# Patient Record
Sex: FEMALE | Race: WHITE | NOT HISPANIC OR LATINO | URBAN - METROPOLITAN AREA
[De-identification: names, ages, dates, MRNs, and addresses within clinical notes are randomized per-mention and may not be internally consistent; named-entity substitution may affect disease eponyms.]

---

## 2017-01-05 ENCOUNTER — OFFICE (OUTPATIENT)
Dept: URBAN - METROPOLITAN AREA CLINIC 14 | Facility: CLINIC | Age: 82
End: 2017-01-05
Payer: MEDICARE

## 2017-01-05 VITALS
SYSTOLIC BLOOD PRESSURE: 150 MMHG | WEIGHT: 88 LBS | DIASTOLIC BLOOD PRESSURE: 86 MMHG | HEIGHT: 59 IN | HEART RATE: 98 BPM

## 2017-01-05 DIAGNOSIS — I10 ESSENTIAL (PRIMARY) HYPERTENSION: ICD-10-CM

## 2017-01-05 DIAGNOSIS — K21.9 GASTRO-ESOPHAGEAL REFLUX DISEASE WITHOUT ESOPHAGITIS: ICD-10-CM

## 2017-01-05 DIAGNOSIS — R14.0 ABDOMINAL DISTENSION (GASEOUS): ICD-10-CM

## 2017-01-05 DIAGNOSIS — R63.6 UNDERWEIGHT: ICD-10-CM

## 2017-01-05 DIAGNOSIS — A04.9 BACTERIAL INTESTINAL INFECTION, UNSPECIFIED: ICD-10-CM

## 2017-01-05 PROCEDURE — 99213 OFFICE O/P EST LOW 20 MIN: CPT | Performed by: INTERNAL MEDICINE

## 2017-01-05 PROCEDURE — G8427 DOCREV CUR MEDS BY ELIG CLIN: HCPCS | Performed by: INTERNAL MEDICINE

## 2017-01-05 NOTE — SERVICEHPINOTES
Mrs. Bermudez is an 81-year-old female here for follow-up of abdominal bloating, pain, and change in bowel habits. She was initially seen by me in 11/14. At that time she was having abdominal pain and bloating and had an extensive negative workup including CT scan, CT angiogram, EGD, and blood work. Lactulose breath test was strongly positive and so she was given a trial of rifaximin. This greatly improved her symptoms and she had been doing well up until 2015 when she had return of bloating and lower abdominal pain. Blood work was negative. CT scan was performed and was also negative. Around the time she was placed on antibiotics for urinary tract infection. Her symptoms were similar to her bout of small intestinal bacterial overgrowth. Because of that we placed her on another course of rifaximin, which she tolerated very well . Her last colonoscopy was in 2011 and was normal. Recall colonoscopy was recommended for 10 years.The patient now comes in for scheduled follow-up. She has been doing well over the past year. She denies any fevers, chills, nausea, vomiting, abdominal pain, bloating, diarrhea, constipation, or rectal bleeding. Her weight is unchanged. She states that she was diagnosed with bladder cancer a few months ago and has had some procedures for this appears to be doing well at this point.BR

## 2017-01-05 NOTE — SERVICENOTES
The patient has been doing well.  She has not had any flares of her bacterial overgrowth.  She was told that she may follow-up on an as-needed basis and notify us if she has any similar symptoms at which time we can consider another trial of Xifaxan, which has worked well for her in the past on two separate occasions.  In the meantime she should continue her probiotic.  Her reflux is also much more improved as she states that she only takes Nexium on an as-needed basis.  I did recommend that she consider switching to Pepcid or Zantac to take as needed but she should notify us she has to take Nexium more regularly.  As she is not taking Nexium on a regular basis we will have her follow up as needed.

## 2021-04-29 ENCOUNTER — OFFICE (OUTPATIENT)
Dept: URBAN - METROPOLITAN AREA CLINIC 14 | Facility: CLINIC | Age: 86
End: 2021-04-29
Payer: OTHER GOVERNMENT

## 2021-04-29 VITALS
DIASTOLIC BLOOD PRESSURE: 69 MMHG | RESPIRATION RATE: 16 BRPM | WEIGHT: 81 LBS | SYSTOLIC BLOOD PRESSURE: 150 MMHG | HEIGHT: 59 IN | HEART RATE: 65 BPM | OXYGEN SATURATION: 93 %

## 2021-04-29 DIAGNOSIS — C76.0 MALIGNANT NEOPLASM OF HEAD, FACE AND NECK: ICD-10-CM

## 2021-04-29 DIAGNOSIS — A04.9 BACTERIAL INTESTINAL INFECTION, UNSPECIFIED: ICD-10-CM

## 2021-04-29 DIAGNOSIS — K59.00 CONSTIPATION, UNSPECIFIED: ICD-10-CM

## 2021-04-29 DIAGNOSIS — R10.30 LOWER ABDOMINAL PAIN, UNSPECIFIED: ICD-10-CM

## 2021-04-29 DIAGNOSIS — R14.0 ABDOMINAL DISTENSION (GASEOUS): ICD-10-CM

## 2021-04-29 PROCEDURE — 99204 OFFICE O/P NEW MOD 45 MIN: CPT | Performed by: INTERNAL MEDICINE

## 2021-04-29 RX ORDER — POLYETHYLENE GLYCOL 3350 17 G/17G
POWDER, FOR SOLUTION ORAL
Qty: 3 | Refills: 3 | Status: ACTIVE
Start: 2021-04-29

## 2021-04-29 NOTE — SERVICEHPINOTES
Mrs. Bermudez is an 85-year-old female here for evaluation of abdominal bloating, pain, and change in bowel habits. The patient was lost to follow-up and overall has been doing well since we last saw her except for diagnosis of head and neck cancer which she states is under better control now. Where she was recently seen by her PCP who did blood work in January 2021 which revealed normal CBC and normal CMP. She states that a few weeks ago she started having some issues with a change in bowel habit where she had one day of diarrhea but then since then has had 2-3 bowel movements a day which are usually hard, small, and formed. She denies any loose stool. She does note that it is associated with increased bloating, gas, and crampy lower abdominal pain. The pain is better after having a bowel movement. She denies any recent antibiotics or changes in her diet. She denies any recent travel. She states that she did lose weight whenever she underwent treatment for head and neck cancer but that her weight has been stable since then. The patient is due for average risk screening colonoscopy, however given her age and comorbidity she would like to forego this.Previous GI History:Philomena was initially seen by me in 11/14. At that time she was having abdominal pain and bloating and had an extensive negative workup including CT scan, CT angiogram, EGD, and blood work. Lactulose breath test was strongly positive and so she was given a trial of rifaximin. This greatly improved her symptoms and she had been doing well up until 2015 when she had return of bloating and lower abdominal pain. Blood work was negative. CT scan was performed and was also negative. Around the time she was placed on antibiotics for urinary tract infection. Her symptoms were similar to her bout of small intestinal bacterial overgrowth. Because of that we placed her on another course of rifaximin, which she tolerated very well. Her last colonoscopy was in 2011 and was normal.ASYA

## 2021-04-29 NOTE — SERVICENOTES
The patient is having symptoms similar to when we saw her few years ago.  Her symptoms appear more constipation predominant this time with small hard bowel movements that are not complete.  This is associated with bloating and crampy lower abdominal pain.  Because of this the patient would like to start more conservatively trying MiraLax daily which is reasonable.  I did recommend she take a probiotic as well. If her bowel habits improved but she continues to have bloating and pain then I do recommend a trial of Xifaxan or cross-sectional abdominal imaging as well. We did discuss whether not to go with average risk screening colonoscopy, however given her age and comorbidities the patient states that she would prefer not undergo this if at all possible.  This is fine, however if her symptoms do not improve then it may be reasonable to undergo colonoscopy.  Fecal occult blood testing today was negative.

## 2021-06-10 ENCOUNTER — OFFICE (OUTPATIENT)
Dept: URBAN - METROPOLITAN AREA CLINIC 14 | Facility: CLINIC | Age: 86
End: 2021-06-10
Payer: OTHER GOVERNMENT

## 2021-06-10 VITALS
SYSTOLIC BLOOD PRESSURE: 153 MMHG | WEIGHT: 78 LBS | OXYGEN SATURATION: 93 % | RESPIRATION RATE: 18 BRPM | HEART RATE: 60 BPM | HEIGHT: 57 IN | DIASTOLIC BLOOD PRESSURE: 75 MMHG

## 2021-06-10 DIAGNOSIS — A04.9 BACTERIAL INTESTINAL INFECTION, UNSPECIFIED: ICD-10-CM

## 2021-06-10 DIAGNOSIS — K59.00 CONSTIPATION, UNSPECIFIED: ICD-10-CM

## 2021-06-10 DIAGNOSIS — R10.30 LOWER ABDOMINAL PAIN, UNSPECIFIED: ICD-10-CM

## 2021-06-10 DIAGNOSIS — R63.4 ABNORMAL WEIGHT LOSS: ICD-10-CM

## 2021-06-10 DIAGNOSIS — C76.0 MALIGNANT NEOPLASM OF HEAD, FACE AND NECK: ICD-10-CM

## 2021-06-10 DIAGNOSIS — R14.0 ABDOMINAL DISTENSION (GASEOUS): ICD-10-CM

## 2021-06-10 LAB
CBC, PLATELET, NO DIFFERENTIAL: HEMATOCRIT: 37.7 % (ref 34–46.6)
CBC, PLATELET, NO DIFFERENTIAL: HEMOGLOBIN: 12 G/DL (ref 11.1–15.9)
CBC, PLATELET, NO DIFFERENTIAL: MCH: 29.5 PG (ref 26.6–33)
CBC, PLATELET, NO DIFFERENTIAL: MCHC: 31.8 G/DL (ref 31.5–35.7)
CBC, PLATELET, NO DIFFERENTIAL: MCV: 93 FL (ref 79–97)
CBC, PLATELET, NO DIFFERENTIAL: NRBC: (no result)
CBC, PLATELET, NO DIFFERENTIAL: PLATELETS: 229 X10E3/UL (ref 150–450)
CBC, PLATELET, NO DIFFERENTIAL: RBC: 4.07 X10E6/UL (ref 3.77–5.28)
CBC, PLATELET, NO DIFFERENTIAL: RDW: 14.1 % (ref 11.7–15.4)
CBC, PLATELET, NO DIFFERENTIAL: WBC: 4.9 X10E3/UL (ref 3.4–10.8)

## 2021-06-10 PROCEDURE — 99214 OFFICE O/P EST MOD 30 MIN: CPT | Performed by: INTERNAL MEDICINE

## 2021-06-10 NOTE — SERVICENOTES
The patient's constipation is improved which has improved some of her symptoms though she still does have some bloating and abdominal pain.  She did respond very well to Xifaxan the past so we will give her another course of this.  Because of persistent symptoms will also check cross-sectional abdominal imaging as well. We did discuss the possibility of EGD and colonoscopy given her symptoms and weight loss.  The patient still is hesitant to proceed with this given her age and frailty, however she states that she would be willing to undergo this pending results of her noninvasive studies as above.  Of no recent fecal occult blood testing was negative.

## 2021-06-10 NOTE — SERVICEHPINOTES
Mrs. Bermudez is an 85-year-old female here for follow up. After we last saw her we did check a KUB which was unremarkable. We did recommend she start taking some MiraLax and fiber because her constipation. She states her constipation is much improved and this has improved some of her symptoms though she still does have some occasional crampy lower abdominal pain associated with bloating and gas. Of note, the patient states that she has lost weight due to her head neck cancer and bladder cancer. When we saw her in April 2021 she weighed 81 lb and now she is down to 78 lb. She states that she is normally around 78 lb though are prior visits from a few years ago revealed that she is more in the upper 80s or 90s. She denies any nausea or vomiting. She states that she is eating well and try to take nutritional supplements. FOBT in April 2021 was negative.Previous GI History:Philomena was initially seen by me in 11/14. At that time she was having abdominal pain and bloating and had an extensive negative workup including CT scan, CT angiogram, EGD, and blood work. Lactulose breath test was strongly positive and so she was given a trial of rifaximin. This greatly improved her symptoms and she had been doing well up until 2015 when she had return of bloating and lower abdominal pain. Blood work was negative. CT scan was performed and was also negative. Around the time she was placed on antibiotics for urinary tract infection. Her symptoms were similar to her bout of small intestinal bacterial overgrowth. Because of that we placed her on another course of rifaximin, which she tolerated very well. Her last colonoscopy was in 2011 and was normal. The patient was lost to follow-up and overall has been doing well since we last saw her except for diagnosis of head and neck cancer which she states is under better control now. Where she was recently seen by her PCP who did blood work in January 2021 which revealed normal CBC and normal CMP. She states that in March 2021 she started having some issues with a change in bowel habit where she had one day of diarrhea but then since then has had 2-3 bowel movements a day which are usually hard, small, and formed. She denies any loose stool. She does note that it is associated with increased bloating, gas, and crampy lower abdominal pain.

## 2021-06-16 ENCOUNTER — OFFICE (OUTPATIENT)
Dept: URBAN - METROPOLITAN AREA CLINIC 14 | Facility: CLINIC | Age: 86
End: 2021-06-16
Payer: MEDICARE

## 2021-06-16 DIAGNOSIS — K59.00 CONSTIPATION, UNSPECIFIED: ICD-10-CM

## 2021-06-16 PROCEDURE — 82274 ASSAY TEST FOR BLOOD FECAL: CPT | Mod: QW | Performed by: INTERNAL MEDICINE

## 2021-07-15 ENCOUNTER — OFFICE (OUTPATIENT)
Dept: URBAN - METROPOLITAN AREA CLINIC 14 | Facility: CLINIC | Age: 86
End: 2021-07-15

## 2021-07-15 VITALS
OXYGEN SATURATION: 93 % | SYSTOLIC BLOOD PRESSURE: 147 MMHG | WEIGHT: 79 LBS | RESPIRATION RATE: 18 BRPM | DIASTOLIC BLOOD PRESSURE: 71 MMHG | HEIGHT: 57 IN | HEART RATE: 58 BPM

## 2021-07-15 DIAGNOSIS — R63.4 ABNORMAL WEIGHT LOSS: ICD-10-CM

## 2021-07-15 DIAGNOSIS — R14.0 ABDOMINAL DISTENSION (GASEOUS): ICD-10-CM

## 2021-07-15 DIAGNOSIS — K59.00 CONSTIPATION, UNSPECIFIED: ICD-10-CM

## 2021-07-15 DIAGNOSIS — A04.9 BACTERIAL INTESTINAL INFECTION, UNSPECIFIED: ICD-10-CM

## 2021-07-15 DIAGNOSIS — C76.0 MALIGNANT NEOPLASM OF HEAD, FACE AND NECK: ICD-10-CM

## 2021-07-15 PROCEDURE — 99213 OFFICE O/P EST LOW 20 MIN: CPT | Performed by: INTERNAL MEDICINE

## 2021-07-15 NOTE — SERVICEHPINOTES
Mrs. Bermudez is an 85-year-old female here for follow up. When we last saw her in June 2021 we did check CBC which was completely normal. We also checked stool for occult blood which were negative x3. CT scan of the abdomen and pelvis with contrast was also performed which was normal except for mild prominence of the pancreatic duct which is not changed from exam years ago. We did give her a trial of Xifaxan and her symptoms of bloating have resolved. She no longer has any abdominal pain. She also states her constipation is better. She states that she is eating normally. She has gained 1 lb since we last saw her. She denies any fevers, chills, nausea, vomiting, dysphagia, or rectal bleeding.Previous GI History:Philomena was initially seen by me in 11/14. At that time she was having abdominal pain and bloating and had an extensive negative workup including CT scan, CT angiogram, EGD, and blood work. Lactulose breath test was strongly positive and so she was given a trial of rifaximin. This greatly improved her symptoms and she had been doing well up until 2015 when she had return of bloating and lower abdominal pain. Blood work was negative. CT scan was performed and was also negative. Around the time she was placed on antibiotics for urinary tract infection. Her symptoms were similar to her bout of small intestinal bacterial overgrowth. Because of that we placed her on another course of rifaximin, which she tolerated very well. Her last colonoscopy was in 2011 and was normal. The patient was lost to follow-up and overall has been doing well since we last saw her except for diagnosis of head and neck cancer which she states is under better control now. Where she was recently seen by her PCP who did blood work in January 2021 which revealed normal CBC and normal CMP. She states that in March 2021 she started having some issues with a change in bowel habit where she had one day of diarrhea but then since then has had 2-3 bowel movements a day which are usually hard, small, and formed. She denies any loose stool. She does note that it is associated with increased bloating, gas, and crampy lower abdominal pain. We did check a KUB which was unremarkable. We did recommend she start taking some MiraLax and fiber because her constipation, which improved her symptoms. Of note, the patient states that she has lost weight due to her head neck cancer and bladder cancer. When we saw her in April 2021 she weighed 81 lb and now she is down to 78 lb. She states that she is normally around 78 lb. FOBT in April 2021 was negative.

## 2021-07-15 NOTE — SERVICENOTES
The patient continues to state that she would prefer not undergo EGD or colonoscopy unless she has only has to.  Given that she has a normal CBC, normal CT scan, hemosure testing x4 negative, and now resolution of all of her symptoms with slight improvement overweight I cannot say that the benefits are outweighed by the risks.  She also states that her family would prefer her not undergo this if at all possible.  As such, will hold off on endoscopic evaluation, however if she starts having return of symptoms or further weight loss she was instructed to notify us so we can consider proceeding with endoscopic evaluation.